# Patient Record
Sex: FEMALE | Race: WHITE | URBAN - METROPOLITAN AREA
[De-identification: names, ages, dates, MRNs, and addresses within clinical notes are randomized per-mention and may not be internally consistent; named-entity substitution may affect disease eponyms.]

---

## 2017-06-24 ENCOUNTER — OFFICE VISIT (OUTPATIENT)
Dept: URGENT CARE | Facility: URGENT CARE | Age: 22
End: 2017-06-24
Payer: COMMERCIAL

## 2017-06-24 VITALS
HEART RATE: 70 BPM | WEIGHT: 111 LBS | DIASTOLIC BLOOD PRESSURE: 73 MMHG | SYSTOLIC BLOOD PRESSURE: 107 MMHG | TEMPERATURE: 99 F

## 2017-06-24 DIAGNOSIS — J02.0 STREPTOCOCCAL SORE THROAT: ICD-10-CM

## 2017-06-24 DIAGNOSIS — R07.0 THROAT PAIN: Primary | ICD-10-CM

## 2017-06-24 LAB
DEPRECATED S PYO AG THROAT QL EIA: ABNORMAL
MICRO REPORT STATUS: ABNORMAL
SPECIMEN SOURCE: ABNORMAL

## 2017-06-24 PROCEDURE — 87880 STREP A ASSAY W/OPTIC: CPT | Performed by: INTERNAL MEDICINE

## 2017-06-24 PROCEDURE — 99203 OFFICE O/P NEW LOW 30 MIN: CPT | Performed by: INTERNAL MEDICINE

## 2017-06-24 RX ORDER — AMOXICILLIN 500 MG/1
500 CAPSULE ORAL 3 TIMES DAILY
Qty: 30 CAPSULE | Refills: 0 | Status: SHIPPED | OUTPATIENT
Start: 2017-06-24

## 2017-06-24 NOTE — MR AVS SNAPSHOT
"              After Visit Summary   6/24/2017    Lulu Leary    MRN: [id number]           Patient Information     Date Of Birth          1995        Visit Information        Provider Department      6/24/2017 5:40 PM Jordana Welsh MD [facility]        Today's Diagnoses     Throat pain    -  1    Streptococcal sore throat          Care Instructions      Fluids.  Rest.  Amoxicillin.    Rapid Strep A Screen (Abnormal) 06/24/2017  6:42 PM 63   POSITIVE: Group A Streptococcal antigen detected by immunoassay.   Micro Report Status       Call or return to clinic if symptoms worsen or fail to improve as anticipated.            Follow-ups after your visit        Who to contact     If you have questions or need follow up information about today's clinic visit or your schedule please contact [facility] directly at [phone].  Normal or non-critical lab and imaging results will be communicated to you by [email or web address], letter or phone within 4 business days after the clinic has received the results. If you do not hear from us within 7 days, please contact the clinic through MyChart or phone. If you have a critical or abnormal lab result, we will notify you by phone as soon as possible.  Submit refill requests through [employer] or call your pharmacy and they will forward the refill request to us. Please allow 3 business days for your refill to be completed.          Additional Information About Your Visit        MyChart Information     [employer] lets you send messages to your doctor, view your test results, renew your prescriptions, schedule appointments and more. To sign up, go to [email or web address] . Click on \"Log in\" on the left side of the screen, which will take you to the Welcome page. Then click on \"Sign up Now\" on the right side of the page.     You will be asked to enter the access code listed below, as well as some personal information. Please " follow the directions to create your username and password.     Your access code is: [email or web address]  Expires: 2017  6:59 PM     Your access code will  in 90 days. If you need help or a new code, please call your [place] clinic or [phone].        Care EveryWhere ID     This is your Care EveryWhere ID. This could be used by other organizations to access your [place] medical records  [id number]        Your Vitals Were     Pulse Temperature                70 99  F (37.2  C)           Blood Pressure from Last 3 Encounters:   17 107/73    Weight from Last 3 Encounters:   17 111 lb (50.3 kg)              We Performed the Following     Rapid strep screen          Today's Medication Changes          These changes are accurate as of: 17  6:59 PM.  If you have any questions, ask your nurse or doctor.               Start taking these medicines.        Dose/Directions    amoxicillin 500 MG capsule   Commonly known as:  AMOXIL   Used for:  Streptococcal sore throat   Started by:  Jordana Weslh MD        Dose:  500 mg   Take 1 capsule (500 mg) by mouth 3 times daily   Quantity:  30 capsule   Refills:  0            Where to get your medicines      These medications were sent to [facility] [address] [place] [address] [address] AT [facility]  [address], [facility] [address]    Hours:  24-hours Phone:  [phone]     amoxicillin 500 MG capsule                Primary Care Provider    None Specified       No primary provider on file.        Equal Access to Services     [facility]: Hadii aad ku hadasho Soomaali, waaxda luqadaha, qaybta kaalmada adeegyada, duke soto . So [facility] [phone].    ATENCIÓN: Si habla español, tiene a lazaro disposición servicios gratuitos de asistencia lingüística. Llame al [phone].    We comply with applicable federal civil rights laws and [place] laws. We do not discriminate on the basis  of race, color, national origin, age, disability sex, sexual orientation or gender identity.            Thank you!     Thank you for choosing [facility] [place]  for your care. Our goal is always to provide you with excellent care. Hearing back from our patients is one way we can continue to improve our services. Please take a few minutes to complete the written survey that you may receive in the mail after your visit with us. Thank you!             Your Updated Medication List - Protect others around you: Learn how to safely use, store and throw away your medicines at www.disposemymeds.org.          This list is accurate as of: 6/24/17  6:59 PM.  Always use your most recent med list.                   Brand Name Dispense Instructions for use Diagnosis    ADDERALL PO      Take 10 mg by mouth daily        amoxicillin 500 MG capsule    AMOXIL    30 capsule    Take 1 capsule (500 mg) by mouth 3 times daily    Streptococcal sore throat       DEPO-PROVERA IM           SPIRONOLACTONE PO      Take 50 mg by mouth daily

## 2017-06-24 NOTE — PROGRESS NOTES
SUBJECTIVE: 21 year old female with   Chief Complaint   Patient presents with     Throat Pain     throat pain for 16hrs.     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  Hx strep throat  PMH negative   PSH - wisdom teeth  fhx negative    /73  Pulse 70  Temp 99  F (37.2  C)  Wt 111 lb (50.3 kg)    OBJECTIVE:   Vitals as noted above.  Appears healthy and alert.  Oropharynx: moderate erythema and exudates present  Neck: normal, supple and moderate nontender anterior cervical nodes    ASSESSMENT: Streptococcal pharyngitis  Patient Instructions       Fluids.  Rest.  Amoxicillin.    Rapid Strep A Screen (Abnormal) 06/24/2017  6:42 PM 63   POSITIVE: Group A Streptococcal antigen detected by immunoassay.   Micro Report Status       Call or return to clinic if symptoms worsen or fail to improve as anticipated.

## 2017-06-24 NOTE — NURSING NOTE
Chief Complaint   Patient presents with     Throat Pain     throat pain for 16hrs.       Initial /73  Pulse 70  Temp 99  F (37.2  C)  Wt 111 lb (50.3 kg) There is no height or weight on file to calculate BMI.  Medication Reconciliation: complete

## 2017-06-24 NOTE — PATIENT INSTRUCTIONS
Fluids.  Rest.  Amoxicillin.    Rapid Strep A Screen (Abnormal) 06/24/2017  6:42 PM 63   POSITIVE: Group A Streptococcal antigen detected by immunoassay.   Micro Report Status       Call or return to clinic if symptoms worsen or fail to improve as anticipated.